# Patient Record
(demographics unavailable — no encounter records)

---

## 2025-05-28 NOTE — PHYSICAL EXAM
[No Acute Distress] : no acute distress [Normal Sclera/Conjunctiva] : normal sclera/conjunctiva [PERRL] : pupils equal round and reactive to light [EOMI] : extraocular movements intact [Soft] : abdomen soft [Non Tender] : non-tender [Non-distended] : non-distended [No Masses] : no abdominal mass palpated [No HSM] : no HSM [Normal Bowel Sounds] : normal bowel sounds

## 2025-05-28 NOTE — REVIEW OF SYSTEMS
[Fever] : no fever [Chills] : no chills [Night Sweats] : no night sweats [Chest Pain] : no chest pain [Palpitations] : no palpitations [Lower Ext Edema] : no lower extremity edema [Shortness Of Breath] : no shortness of breath [Wheezing] : no wheezing [Cough] : no cough [Dyspnea on Exertion] : not dyspnea on exertion [Nausea] : no nausea [Constipation] : no constipation [Diarrhea] : no diarrhea [Vomiting] : no vomiting [Heartburn] : no heartburn [Melena] : no melena [Dysuria] : no dysuria

## 2025-05-28 NOTE — HISTORY OF PRESENT ILLNESS
[FreeTextEntry8] : 33M presents with 3 weeks of LUQ pain. The pain is intermittent. Not always associated with food. Denies fever, chills, N/V, dysphagia, melena. Has not tried anything for his symptoms. Had flu about 3 weeks ago.

## 2025-05-28 NOTE — ASSESSMENT
[FreeTextEntry1] : liver lesion: CTA 11/2024 reviewed which showed liver lesion. Discussed MR abdomen for better characterization.  LUQ pain: Most likely due to gastritis. Recommended omeprazole 40mg daily. Check labs including h pylori.